# Patient Record
Sex: FEMALE | Race: WHITE | NOT HISPANIC OR LATINO | Employment: FULL TIME | URBAN - METROPOLITAN AREA
[De-identification: names, ages, dates, MRNs, and addresses within clinical notes are randomized per-mention and may not be internally consistent; named-entity substitution may affect disease eponyms.]

---

## 2020-10-06 ENCOUNTER — TRANSFERRED RECORDS (OUTPATIENT)
Dept: HEALTH INFORMATION MANAGEMENT | Facility: CLINIC | Age: 65
End: 2020-10-06

## 2020-10-22 ENCOUNTER — TRANSFERRED RECORDS (OUTPATIENT)
Dept: HEALTH INFORMATION MANAGEMENT | Facility: CLINIC | Age: 65
End: 2020-10-22

## 2020-11-19 ENCOUNTER — TRANSFERRED RECORDS (OUTPATIENT)
Dept: HEALTH INFORMATION MANAGEMENT | Facility: CLINIC | Age: 65
End: 2020-11-19

## 2020-12-02 ENCOUNTER — MEDICAL CORRESPONDENCE (OUTPATIENT)
Dept: HEALTH INFORMATION MANAGEMENT | Facility: CLINIC | Age: 65
End: 2020-12-02

## 2020-12-02 ENCOUNTER — TRANSCRIBE ORDERS (OUTPATIENT)
Dept: OTHER | Age: 65
End: 2020-12-02

## 2020-12-02 DIAGNOSIS — J34.89 LESION OR MASS OF PARANASAL SINUSES: Primary | ICD-10-CM

## 2020-12-03 ENCOUNTER — TELEPHONE (OUTPATIENT)
Dept: OTOLARYNGOLOGY | Facility: CLINIC | Age: 65
End: 2020-12-03

## 2020-12-03 ENCOUNTER — TRANSCRIBE ORDERS (OUTPATIENT)
Dept: OTOLARYNGOLOGY | Facility: CLINIC | Age: 65
End: 2020-12-03

## 2020-12-03 NOTE — TELEPHONE ENCOUNTER
"[facility] Call Center    Phone Message    May a detailed message be left on voicemail: no     Reason for Call: Appointment Intake    Referring Provider Name: Dr Amarjit Hartman at [facility]   Diagnosis and/or Symptoms: \"lesion or mass of paranasal sinuses. Very erosive/ destructive left maxillary sinus and ethmoid process. Several biopsies were benign, minimal symptoms. Please examine CT scan and pt for further evaluation and treatment options.\"    Action Taken: Message routed to:  [facility] ([facility] [[id number]]    Travel Screening: Not Applicable                                                                        "

## 2020-12-04 NOTE — TELEPHONE ENCOUNTER
Informed patient that we are reviewing her records to determine which provider she should see, and we will call her to schedule once this is finished. Patient expressed understanding.    Leann Shaw, EMT

## 2020-12-07 ENCOUNTER — DOCUMENTATION ONLY (OUTPATIENT)
Dept: OTOLARYNGOLOGY | Facility: CLINIC | Age: 65
End: 2020-12-07

## 2020-12-07 NOTE — PROGRESS NOTES
Action 12/7/2020 3:21pm -Bhao   Action Taken Received disc with images from [facility]. Disc was dropped off to [facility]N for image to be uploaded into PACS. Radiology reports were faxed to scan to uploaded into Pt's chart.    Images received:  - Ankle Three View: 10/6/2020  - CT Head Brain: 10/6/2020  - Cervical Spine:  10/6/2020  - CT Head [place]: 10/22/2020

## 2020-12-29 NOTE — TELEPHONE ENCOUNTER
Referring Provider called back, Pt told them that they never heard back about getting this scheduled. Please call Pt back to schedule ASAP. Thanks!

## 2021-01-06 ENCOUNTER — DOCUMENTATION ONLY (OUTPATIENT)
Dept: CARE COORDINATION | Facility: CLINIC | Age: 66
End: 2021-01-06

## 2021-01-12 DIAGNOSIS — J34.89 MASS OF PARANASAL SINUS: Primary | ICD-10-CM

## 2021-01-13 ENCOUNTER — PRE VISIT (OUTPATIENT)
Dept: OTOLARYNGOLOGY | Facility: CLINIC | Age: 66
End: 2021-01-13

## 2021-01-13 ENCOUNTER — OFFICE VISIT (OUTPATIENT)
Dept: OTOLARYNGOLOGY | Facility: CLINIC | Age: 66
End: 2021-01-13
Payer: COMMERCIAL

## 2021-01-13 VITALS
SYSTOLIC BLOOD PRESSURE: 130 MMHG | TEMPERATURE: 97.7 F | HEART RATE: 69 BPM | HEIGHT: 62 IN | DIASTOLIC BLOOD PRESSURE: 74 MMHG | BODY MASS INDEX: 26.5 KG/M2 | WEIGHT: 144 LBS

## 2021-01-13 DIAGNOSIS — R09.82 POST-NASAL DRIP: ICD-10-CM

## 2021-01-13 DIAGNOSIS — R09.81 NASAL CONGESTION: ICD-10-CM

## 2021-01-13 DIAGNOSIS — J32.0 CHRONIC MAXILLARY SINUSITIS: Primary | ICD-10-CM

## 2021-01-13 PROCEDURE — 87186 SC STD MICRODIL/AGAR DIL: CPT | Mod: 90 | Performed by: PATHOLOGY

## 2021-01-13 PROCEDURE — 87075 CULTR BACTERIA EXCEPT BLOOD: CPT | Mod: 90 | Performed by: PATHOLOGY

## 2021-01-13 PROCEDURE — 87070 CULTURE OTHR SPECIMN AEROBIC: CPT | Mod: 90 | Performed by: PATHOLOGY

## 2021-01-13 PROCEDURE — 87076 CULTURE ANAEROBE IDENT EACH: CPT | Mod: 90 | Performed by: PATHOLOGY

## 2021-01-13 PROCEDURE — 31237 NSL/SINS NDSC SURG BX POLYPC: CPT | Mod: 50 | Performed by: OTOLARYNGOLOGY

## 2021-01-13 PROCEDURE — 87077 CULTURE AEROBIC IDENTIFY: CPT | Mod: 90 | Performed by: PATHOLOGY

## 2021-01-13 PROCEDURE — 99204 OFFICE O/P NEW MOD 45 MIN: CPT | Mod: 25 | Performed by: OTOLARYNGOLOGY

## 2021-01-13 PROCEDURE — 99000 SPECIMEN HANDLING OFFICE-LAB: CPT | Performed by: PATHOLOGY

## 2021-01-13 RX ORDER — IBUPROFEN 200 MG
200 TABLET ORAL EVERY 4 HOURS PRN
COMMUNITY

## 2021-01-13 ASSESSMENT — PAIN SCALES - GENERAL: PAINLEVEL: NO PAIN (0)

## 2021-01-13 ASSESSMENT — MIFFLIN-ST. JEOR: SCORE: 1156.43

## 2021-01-13 NOTE — PROGRESS NOTES
[facility]                   New Patient Visit      Encounter date: January 13, 2021    Referring Provider: Amarjit Hartman MD    Chief Complaint: maxillary sinusitis    History of Present Illness: Bridgette Goddard is a 65 year-old woman who is sent by Dr. Amarjit Hartman MD for destructive process of left maxillary sinus. This was discovered on CT head incidentally after she suffered a fall. She followed up with Dr. Hartman in [place] who noted significant crusting/erosion of the middle turbinate and maxillary sinus.  She has undergone multiple biopsies which have been reviewed by [facility], but only returned inflammatory tissue. She has been treated with 3 weeks of antibiotics without improvement in her symptoms. She complains primarily of left-sided nasal congestion, facial pressure, post-nasal drip and mucus collection, which she sometimes expectorates. She has used a number of OTC meds without improvement. She denies any prior history of immunosuppression, including history of DM, cancer, or cancer treatment.     She does admit to a periodontal disease and has had multiple procedures for maxillary molar disease.     Sino-Nasal Outcome Test (SNOT - 22)  [facility]    Review of systems: A 14-point review of systems has been conducted and was negative for any notable symptoms, except as dictated in the history of present illness.     No past medical history on file.     No past surgical history on file.     No family history on file.     Social History     Socioeconomic History     Marital status:      Spouse name: Not on file     Number of children: Not on file     Years of education: Not on file     Highest education level: Not on file   Occupational History     Not on file   Social Needs     Financial resource strain: Not on file     Food insecurity     Worry: Not on file     Inability: Not on file     Transportation needs     Medical: Not on file     Non-medical: Not on  file   Tobacco Use     Smoking status: Not on file   Substance and Sexual Activity     Alcohol use: Not on file     Drug use: Not on file     Sexual activity: Not on file   Lifestyle     Physical activity     Days per week: Not on file     Minutes per session: Not on file     Stress: Not on file   Relationships     Social connections     Talks on phone: Not on file     Gets together: Not on file     Attends Gnosticism service: Not on file     Active member of club or organization: Not on file     Attends meetings of clubs or organizations: Not on file     Relationship status: Not on file     Intimate partner violence     Fear of current or ex partner: Not on file     Emotionally abused: Not on file     Physically abused: Not on file     Forced sexual activity: Not on file   Other Topics Concern     Not on file   Social History Narrative     Not on file        Physical Exam:  Vital signs: There were no vitals taken for this visit.   General Appearance: No acute distress, appropriate demeanor, conversant  Eyes: moist conjunctivae; EOMI; pupils symmetric; visual acuity grossly intact; no proptosis  Head: normocephalic; overall symmetric appearance without deformity  Face: overall symmetric without deformity; HB I/VI  Ears: Normal appearance of external ear; external meatus normal in appearance; TMs intact without perforation bilaterally;   Nose: No external deformity; septum midline; inferior turbinates without significant hypertrophy  Oral Cavity/oropharynx: Normal appearance of mucosa; tongue midline; no mass or lesions oropharynx without obvious mucosal abnormality  Neck: no palpable lymphadenopathy; thyroid without palpable nodules  Lungs: symmetric chest rise; no wheezing  CV: Good distal perfusion; normal heart rate  Extremities: No deformity  Neurologic Exam: Cranial nerves II-XII are grossly intact; no focal deficit      Procedure Note  Procedure performed: Rigid nasal endoscopy with left-sided  debridement  Indication: To evaluate for sinonasal pathology not visualized on routine anterior rhinoscopy  Anesthesia: 4% topical lidocaine with 0.05% oxymetazoline  Description of procedure: A 30 degree, 3 mm rigid endoscope was inserted into bilateral nasal cavities and the nasal valves, nasal cavity, middle meatus, sphenoethmoid recess, and nasopharynx were thoroughly evaluated for evidence of obstruction, edema, purulence, polyps and/or mass/lesion.     I then used a combination of non-cutting forceps, straight and curved suction to clear the left maxillary sinus, ethmoid cavity, and SER of crust, and purulent debris.       Concetta-Ady Endoscopic Scoring System  Endoscopic observation Right Left   Polyps in middle meatus (0 = absent, 1 = restricted to middle meatus, 2 = Beyond middle meatus) 0 0   Discharge (0 = absent, 1 = thin and clear, 2 = thick, purulent) 0 2   Edema (0 = absent, 1 = mild-moderate, 2 = moderate-severe) 0 1   Crusting (0 = absent, 1 = mild-moderate, 2 = moderate-severe) 0 2   Scarring (0= absent, 1 = mild-moderate, 2 = moderate-severe) 0 0   Total 0 5     Findings  RT: MM and SER clear  LT: thick cast  Of crust of maxillary sinus; MT partially absent; crust of SER; debrided as above    I see no evidence of mucosal changes c/w malignancy after debridement    The patient tolerated the procedure well without complication.     Laboratory Review:  n/a    Imaging Review:  I have reviewed CT sinus from 10/22/2020:   Maxillary thickening of frontal, ethmoid, max and sphenoid sinuses.   The bone of the lamina is thinned or missing  The inferior, middle, and superior turbinate appear to be missing     Pathology Review:  Reviewed outside path results from biopsy - shows primarily inflammation with many plasma cells    Assessment/Medical Decision Making/Plan:  Chronic maxillary sinusitis  Nasal crusting  Post-nasal drip  Nasal congestion    I performed left endo debridement today. I see no endoscopic  findings which would suggest malignancy. 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  The etiology/source of her infection is unclear, but could have been odontogenic. I've sent cultures from her left max sinus. I will start Bridgette on BID irrigations and likely PO abx based on her culture results.     I did discuss with Bridgette the difficulty that can be encountered with treatment of biofilm associated sinusitis.      We will remain vigilant for more sinister disease, should it become more apparent after treatment as above.         Justin Mason MD    [facility]  Rhinology  Endoscopic Skull Base Surgery  [facility]  Department of Otolaryngology - Head & Neck Surgery

## 2021-01-13 NOTE — PATIENT INSTRUCTIONS
You were seen in the ENT clinic today with Dr. Mason    Recommendations for you:    -Gentamicin nasal rinses to left nasal cavity twice daily for one month   -We will call you with the results of your cultures taken today      We would like you to follow up in 6 weeks      Please call our clinic for any questions, concerns, and/or worsening symptoms.      Clinic #[phone]       Option 1 for scheduling.    Thank you for allowing us to be a part of your care!    Talya ABRAHAM, ALEACC    If you need to reach me my direct line is: [phone]       Gentamicin Nasal Irrigations (Teddy's Solution)    - Gentamicin is a compounded antibiotic which means it is made only when ordered and by compounding pharmacies.  [facility]  [address] [place] [address]  [phone]  - This medication will arrive in the mail in capsule form.     Cleaning of the Nasal or Sinus Cavity    Please follow all three steps.  Nasal irrigation (cleaning) should be done 2 times/day.    Preparation:  1.  Wash your hands  2.  We suggest that you buy the NeilMed Sinus Rinse Kit  3.  Use distilled water or tap water that has been boiled and brought to room temperature. This is important because serious infections can result from using tap water that is not clean. These infections are very rare, but it's better to be absolutely safe.  4.  Fill the irrigation bottle with room temperature water (distilled or boiled tap water) and add mixture (pre made packet or homemade recipe).        If you wish to make a homemade recipe:        Mix 1/4 teaspoon salt (kosher,non-iodine salt) with 1/4 teaspoon baking soda in each bottle.  5. Open Gentamicin Nasal Capsule and empty contents into solution.     Cleansing Irrigation/Sinus Rinse:    1.  Lean forward over a sink and insert the rinse bottle applicator into the right side of your nose. Make sure to point the applicator towards the back of your head.     2.  Tilt head down and to  the left side.  With your mouth open (breathing through your mouth), gently direct the water around the inside of your nose until clear fluid starts to drain from the opposite nostril.  This is called flushing or irrigation.    3.  When you have used 1/4 to 1/2 or the solution, switch to the left nostril.    4.  To irrigate the left nostril, tilt your head down and to the right side.  With your mouth open (breathing through your mouth),  gently direct the water around the inside of your nose until clear fluid starts to drain from the opposite nostril.     Cleaning the Equipment:  1.  Throw away any leftover solution  2.  Clean the rinse bottle and cap with clear water. Air dry.      Call the ENT Clinic if you have any questions or concerns at [phone]

## 2021-01-13 NOTE — LETTER
1/13/2021       RE: Bridgette Goddard  [address]  [place] [address]     Dear Colleague,    Thank you for referring your patient, Bridgette Goddard, to the [facility] [place] at [facility]. Please see a copy of my visit note below.        [facility]  New Patient Visit      Encounter date: January 13, 2021    Referring Provider: Amarjit Hartman MD    Chief Complaint: maxillary sinusitis    History of Present Illness: Bridgette Goddard is a 65 year-old woman who is sent by Dr. Amarjit Hartman MD for destructive process of left maxillary sinus. This was discovered on CT head incidentally after she suffered a fall. She followed up with Dr. Hartman in [place] who noted significant crusting/erosion of the middle turbinate and maxillary sinus.  She has undergone multiple biopsies which have been reviewed by [facility], but only returned inflammatory tissue. She has been treated with 3 weeks of antibiotics without improvement in her symptoms. She complains primarily of left-sided nasal congestion, facial pressure, post-nasal drip and mucus collection, which she sometimes expectorates. She has used a number of OTC meds without improvement. She denies any prior history of immunosuppression, including history of DM, cancer, or cancer treatment.     She does admit to a periodontal disease and has had multiple procedures for maxillary molar disease.     Sino-Nasal Outcome Test (SNOT - 22)  [facility]    Review of systems: A 14-point review of systems has been conducted and was negative for any notable symptoms, except as dictated in the history of present illness.     No past medical history on file.     No past surgical history on file.     No family history on file.     Social History     Socioeconomic History     Marital status:      Spouse name: Not on file     Number of children: Not on file     Years of education: Not on file      Highest education level: Not on file   Occupational History     Not on file   Social Needs     Financial resource strain: Not on file     Food insecurity     Worry: Not on file     Inability: Not on file     Transportation needs     Medical: Not on file     Non-medical: Not on file   Tobacco Use     Smoking status: Not on file   Substance and Sexual Activity     Alcohol use: Not on file     Drug use: Not on file     Sexual activity: Not on file   Lifestyle     Physical activity     Days per week: Not on file     Minutes per session: Not on file     Stress: Not on file   Relationships     Social connections     Talks on phone: Not on file     Gets together: Not on file     Attends Adventist service: Not on file     Active member of club or organization: Not on file     Attends meetings of clubs or organizations: Not on file     Relationship status: Not on file     Intimate partner violence     Fear of current or ex partner: Not on file     Emotionally abused: Not on file     Physically abused: Not on file     Forced sexual activity: Not on file   Other Topics Concern     Not on file   Social History Narrative     Not on file        Physical Exam:  Vital signs: There were no vitals taken for this visit.   General Appearance: No acute distress, appropriate demeanor, conversant  Eyes: moist conjunctivae; EOMI; pupils symmetric; visual acuity grossly intact; no proptosis  Head: normocephalic; overall symmetric appearance without deformity  Face: overall symmetric without deformity; HB I/VI  Ears: Normal appearance of external ear; external meatus normal in appearance; TMs intact without perforation bilaterally;   Nose: No external deformity; septum midline; inferior turbinates without significant hypertrophy  Oral Cavity/oropharynx: Normal appearance of mucosa; tongue midline; no mass or lesions oropharynx without obvious mucosal abnormality  Neck: no palpable lymphadenopathy; thyroid without palpable nodules  Lungs:  symmetric chest rise; no wheezing  CV: Good distal perfusion; normal heart rate  Extremities: No deformity  Neurologic Exam: Cranial nerves II-XII are grossly intact; no focal deficit      Procedure Note  Procedure performed: Rigid nasal endoscopy with left-sided debridement  Indication: To evaluate for sinonasal pathology not visualized on routine anterior rhinoscopy  Anesthesia: 4% topical lidocaine with 0.05% oxymetazoline  Description of procedure: A 30 degree, 3 mm rigid endoscope was inserted into bilateral nasal cavities and the nasal valves, nasal cavity, middle meatus, sphenoethmoid recess, and nasopharynx were thoroughly evaluated for evidence of obstruction, edema, purulence, polyps and/or mass/lesion.     I then used a combination of non-cutting forceps, straight and curved suction to clear the left maxillary sinus, ethmoid cavity, and SER of crust, and purulent debris.       Concetta-Ady Endoscopic Scoring System  Endoscopic observation Right Left   Polyps in middle meatus (0 = absent, 1 = restricted to middle meatus, 2 = Beyond middle meatus) 0 0   Discharge (0 = absent, 1 = thin and clear, 2 = thick, purulent) 0 2   Edema (0 = absent, 1 = mild-moderate, 2 = moderate-severe) 0 1   Crusting (0 = absent, 1 = mild-moderate, 2 = moderate-severe) 0 2   Scarring (0= absent, 1 = mild-moderate, 2 = moderate-severe) 0 0   Total 0 5     Findings  RT: MM and SER clear  LT: thick cast  Of crust of maxillary sinus; MT partially absent; crust of SER; debrided as above    I see no evidence of mucosal changes c/w malignancy after debridement    The patient tolerated the procedure well without complication.     Laboratory Review:  n/a    Imaging Review:  I have reviewed CT sinus from 10/22/2020:   Maxillary thickening of frontal, ethmoid, max and sphenoid sinuses.   The bone of the lamina is thinned or missing  The inferior, middle, and superior turbinate appear to be missing     Pathology Review:  Reviewed outside  path results from biopsy - shows primarily inflammation with many plasma cells    Assessment/Medical Decision Making/Plan:  Chronic maxillary sinusitis  Nasal crusting  Post-nasal drip  Nasal congestion    I performed left endo debridement today. I see no endoscopic findings which would suggest malignancy. 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  The etiology/source of her infection is unclear, but could have been odontogenic. I've sent cultures from her left max sinus. I will start Bridgette on BID irrigations and likely PO abx based on her culture results.     I did discuss with Bridgette the difficulty that can be encountered with treatment of biofilm associated sinusitis.      We will remain vigilant for more sinister disease, should it become more apparent after treatment as above.         Justin Mason MD    [facility]  Rhinology  Endoscopic Skull Base Surgery  [facility]  Department of Otolaryngology - Head & Neck Surgery

## 2021-01-16 LAB
BACTERIA SPEC CULT: ABNORMAL
Lab: ABNORMAL
SPECIMEN SOURCE: ABNORMAL

## 2021-01-19 LAB
BACTERIA SPEC CULT: ABNORMAL
BACTERIA SPEC CULT: ABNORMAL
Lab: ABNORMAL
SPECIMEN SOURCE: ABNORMAL

## 2021-01-21 ENCOUNTER — PATIENT OUTREACH (OUTPATIENT)
Dept: OTOLARYNGOLOGY | Facility: CLINIC | Age: 66
End: 2021-01-21

## 2021-01-21 DIAGNOSIS — J32.0 CHRONIC MAXILLARY SINUSITIS: Primary | ICD-10-CM

## 2021-01-21 NOTE — PROGRESS NOTES
Left voicemail for patient to review results of recent nasal culture. Direct contact information provided for patient.     Talya Burks RN

## 2021-01-22 RX ORDER — LEVOFLOXACIN 500 MG/1
500 TABLET, FILM COATED ORAL DAILY
Qty: 14 TABLET | Refills: 0 | Status: SHIPPED | OUTPATIENT
Start: 2021-01-22 | End: 2021-02-05

## 2021-01-22 NOTE — PROGRESS NOTES
Left voicemail again requesting a call back to discuss results of recent culture results. Provided direct contact information for patient.     Talya Burks RN

## 2021-01-22 NOTE — PROGRESS NOTES
Discussed sinus culture results with patient. Writer informed patient of results and informed her that Dr. Mason would like to begin a 14 day course of Levaquin. Writer reviewed potential side effects and to call the clinic should she experience any. Patient is in agreement with this plan and denies any further questions or concerns at this time.     Talya Burks RN

## 2021-02-26 ENCOUNTER — OFFICE VISIT (OUTPATIENT)
Dept: OTOLARYNGOLOGY | Facility: CLINIC | Age: 66
End: 2021-02-26
Payer: COMMERCIAL

## 2021-02-26 VITALS — WEIGHT: 147 LBS | HEART RATE: 81 BPM | OXYGEN SATURATION: 95 % | BODY MASS INDEX: 26.89 KG/M2

## 2021-02-26 DIAGNOSIS — R09.81 NASAL CONGESTION: ICD-10-CM

## 2021-02-26 DIAGNOSIS — J32.0 CHRONIC MAXILLARY SINUSITIS: ICD-10-CM

## 2021-02-26 DIAGNOSIS — J32.0 CHRONIC MAXILLARY SINUSITIS: Primary | ICD-10-CM

## 2021-02-26 PROCEDURE — 87070 CULTURE OTHR SPECIMN AEROBIC: CPT | Mod: 90 | Performed by: PATHOLOGY

## 2021-02-26 PROCEDURE — 87075 CULTR BACTERIA EXCEPT BLOOD: CPT | Mod: 90 | Performed by: PATHOLOGY

## 2021-02-26 PROCEDURE — 99000 SPECIMEN HANDLING OFFICE-LAB: CPT | Performed by: PATHOLOGY

## 2021-02-26 PROCEDURE — 31237 NSL/SINS NDSC SURG BX POLYPC: CPT | Mod: LT | Performed by: OTOLARYNGOLOGY

## 2021-02-26 PROCEDURE — 87076 CULTURE ANAEROBE IDENT EACH: CPT | Mod: 90 | Performed by: PATHOLOGY

## 2021-02-26 PROCEDURE — 99212 OFFICE O/P EST SF 10 MIN: CPT | Mod: 25 | Performed by: OTOLARYNGOLOGY

## 2021-02-26 ASSESSMENT — PAIN SCALES - GENERAL: PAINLEVEL: NO PAIN (0)

## 2021-02-26 NOTE — PATIENT INSTRUCTIONS
1. You were seen in the ENT Clinic today by Dr. Mason.      2.   Plan to meet with your dentist to talk about dental health.    3.   Plan to start and antibiotic based on culture results.    2.   Plan to return to clinic in 2 months.        Please call our clinic for any questions, concerns, and/or worsening symptoms.      Clinic #[phone]       Option 1 for scheduling.     Thank you for allowing us to be a part of your care!     Jennifer MIRANDA LPN  [facility] ENT     If you need to reach me my direct line is: [phone].

## 2021-02-26 NOTE — LETTER
2/26/2021       RE: Bridgette Goddard  [address] [address]     Dear Colleague,    Thank you for referring your patient, Bridgette Goddard, to the [facility] EAR NO[facility]ROAT [facility]INIC [place] at [facility]. Please see a copy of my visit note below.    [facility]                     Return Patient Visit      Encounter date: February 26, 2021    Referring Provider: Amarjit Hartman MD    Chief Complaint: maxillary sinusitis    History of Present Illness/Initial visit: Bridgette Goddard is a 65 year-old woman who is sent by Dr. Amarjit Hartman MD for destructive process of left maxillary sinus. This was discovered on CT head incidentally after she suffered a fall. She followed up with Dr. Hartman in [place] who noted significant crusting/erosion of the middle turbinate and maxillary sinus.  She has undergone multiple biopsies which have been reviewed by [facility], but only returned inflammatory tissue. She has been treated with 3 weeks of antibiotics without improvement in her symptoms. She complains primarily of left-sided nasal congestion, facial pressure, post-nasal drip and mucus collection, which she sometimes expectorates. She has used a number of OTC meds without improvement. She denies any prior history of immunosuppression, including history of DM, cancer, or cancer treatment.     She does admit to a periodontal disease and has had multiple procedures for maxillary molar disease.     Interval visit:  Returns for follow up after completing levaquin and gent rinses  Notes significant improvement in symptoms of left fullness and pressure      Sino-Nasal Outcome Test (SNOT - 22)  DNC    Review of systems: A 14-point review of systems has been conducted and was negative for any notable symptoms, except as dictated in the history of present illness.     No past medical history on file.     No past surgical history on file.      No family history on file.     Social History     Socioeconomic History     Marital status:      Spouse name: Not on file     Number of children: Not on file     Years of education: Not on file     Highest education level: Not on file   Occupational History     Not on file   Social Needs     Financial resource strain: Not on file     Food insecurity     Worry: Not on file     Inability: Not on file     Transportation needs     Medical: Not on file     Non-medical: Not on file   Tobacco Use     Smoking status: Not on file   Substance and Sexual Activity     Alcohol use: Not on file     Drug use: Not on file     Sexual activity: Not on file   Lifestyle     Physical activity     Days per week: Not on file     Minutes per session: Not on file     Stress: Not on file   Relationships     Social connections     Talks on phone: Not on file     Gets together: Not on file     Attends Jewish service: Not on file     Active member of club or organization: Not on file     Attends meetings of clubs or organizations: Not on file     Relationship status: Not on file     Intimate partner violence     Fear of current or ex partner: Not on file     Emotionally abused: Not on file     Physically abused: Not on file     Forced sexual activity: Not on file   Other Topics Concern     Not on file   Social History Narrative     Not on file        Physical Exam:  Vital signs: There were no vitals taken for this visit.   General Appearance: No acute distress, appropriate demeanor, conversant  Eyes: moist conjunctivae; EOMI; pupils symmetric; visual acuity grossly intact; no proptosis  Head: normocephalic; overall symmetric appearance without deformity  Face: overall symmetric without deformity; HB I/VI  Ears: Normal appearance of external ear;   Nose: No external deformity; see nasal endoscopy  Neurologic Exam: Cranial nerves II-XII are grossly intact; no focal deficit      Procedure Note  Procedure performed: Rigid nasal  endoscopy with left-sided debridement  Indication: To evaluate for sinonasal pathology not visualized on routine anterior rhinoscopy  Anesthesia: 4% topical lidocaine with 0.05% oxymetazoline  Description of procedure: A 30 degree, 3 mm rigid endoscope was inserted into bilateral nasal cavities and the nasal valves, nasal cavity, middle meatus, sphenoethmoid recess, and nasopharynx were thoroughly evaluated for evidence of obstruction, edema, purulence, polyps and/or mass/lesion.     I then used a combination of non-cutting forceps, straight and curved suction to clear the left maxillary sinus,  of crust, and purulent debris.       Concetta-Ady Endoscopic Scoring System  Endoscopic observation Right Left   Polyps in middle meatus (0 = absent, 1 = restricted to middle meatus, 2 = Beyond middle meatus) 0 0   Discharge (0 = absent, 1 = thin and clear, 2 = thick, purulent) 0 1   Edema (0 = absent, 1 = mild-moderate, 2 = moderate-severe) 0 1   Crusting (0 = absent, 1 = mild-moderate, 2 = moderate-severe) 0 1   Scarring (0= absent, 1 = mild-moderate, 2 = moderate-severe) 0 0   Total 0 3     Findings  RT: MM and SER clear  LT: small cast of max sinus debrided and cultured;   Significant improvement in crusting and mucosal edema of maxillary sinus    The patient tolerated the procedure well without complication.     Laboratory Review:  Culture Micro Abnormal   Light growth   Staphylococcus epidermidis   Susceptibility testing not routinely done     Culture Micro Abnormal   Heavy growth   Klebsiella (Enterobacter) aerogenes     Culture Micro Abnormal   Moderate growth   Strain 2   Klebsiella (Enterobacter) aerogenes     [facility]   Susceptibility     Klebsiella (enterobacter) aerogenes (2) Klebsiella (enterobacter) aerogenes (3)     DARLING DARLING     CEFEPIME <=1 ug/mL Sensitive <=1 ug/mL Sensitive     CEFTAZIDIME <=1 ug/mL Sensitive <=1 ug/mL Sensitive     CEFTRIAXONE <=1 ug/mL Sensitive <=1 ug/mL Sensitive      CIPROFLOXACIN <=0.25 ug/mL Sensitive <=0.25 ug/mL Sensitive     GENTAMICIN <=1 ug/mL Sensitive <=1 ug/mL Sensitive     LEVOFLOXACIN <=0.12 ug/mL Sensitive <=0.12 ug/mL Sensitive     MEROPENEM <=0.25 ug/mL Sensitive <=0.25 ug/mL Sensitive     Piperacillin/Tazo <=4 ug/mL Sensitive <=4 ug/mL Sensitive     TOBRAMYCIN <=1 ug/mL Sensitive <=1 ug/mL Sensitive     Trimethoprim/Sulfa <=1/19 ug/mL Sensitive <=1/19 ug/mL Sensitive                   Specimen Collected: 01/13/21  2:45 PM Last Resulted: 01/16/21  7:48 AM            Imaging Review:  I have reviewed CT sinus from 10/22/2020:   Maxillary thickening of frontal, ethmoid, max and sphenoid sinuses.   The bone of the lamina is thinned or missing  The inferior, middle, and superior turbinate appear to be missing     Pathology Review:  Reviewed outside path results from biopsy - shows primarily inflammation with many plasma cells    Assessment/Medical Decision Making/Plan:  Chronic maxillary sinusitis  Nasal crusting  Post-nasal drip  Nasal congestion    -I performed left endo debridement today.   -Bridgette's infection/inflammation has significantly improved with levaquin and compounded gent rinses  -I have asked Bridgette to revisit with her dentist to ensure her teeth are in good health, as much of the bacteria cultured from her LT max sinus seems to be from oral cavity, raising suspicion of odontogenic infection.   -I recommend continuing gentamicin rinses until completion of prescription  -Will start antibiotics based on today's culture only for recurrence of symptoms - she will call and let us know should she experience recurrence in symptoms  -RTC in 2 months    Justin Mason MD    [facility]  Rhinology  Endoscopic Skull Base Surgery  [facility]  Department of Otolaryngology - Head & Neck Surgery

## 2021-02-26 NOTE — PROGRESS NOTES
[facility]                   Return Patient Visit      Encounter date: February 26, 2021    Referring Provider: Amarjit Hartman MD    Chief Complaint: maxillary sinusitis    History of Present Illness/Initial visit: Bridgette Goddard is a 65 year-old woman who is sent by Dr. Amarjit Hartman MD for destructive process of left maxillary sinus. This was discovered on CT head incidentally after she suffered a fall. She followed up with Dr. Hartman in [place] who noted significant crusting/erosion of the middle turbinate and maxillary sinus.  She has undergone multiple biopsies which have been reviewed by [facility], but only returned inflammatory tissue. She has been treated with 3 weeks of antibiotics without improvement in her symptoms. She complains primarily of left-sided nasal congestion, facial pressure, post-nasal drip and mucus collection, which she sometimes expectorates. She has used a number of OTC meds without improvement. She denies any prior history of immunosuppression, including history of DM, cancer, or cancer treatment.     She does admit to a periodontal disease and has had multiple procedures for maxillary molar disease.     Interval visit:  Returns for follow up after completing levaquin and gent rinses  Notes significant improvement in symptoms of left fullness and pressure      Sino-Nasal Outcome Test (SNOT - 22)  DNC    Review of systems: A 14-point review of systems has been conducted and was negative for any notable symptoms, except as dictated in the history of present illness.     No past medical history on file.     No past surgical history on file.     No family history on file.     Social History     Socioeconomic History     Marital status:      Spouse name: Not on file     Number of children: Not on file     Years of education: Not on file     Highest education level: Not on file   Occupational History     Not on file   Social Needs      Financial resource strain: Not on file     Food insecurity     Worry: Not on file     Inability: Not on file     Transportation needs     Medical: Not on file     Non-medical: Not on file   Tobacco Use     Smoking status: Not on file   Substance and Sexual Activity     Alcohol use: Not on file     Drug use: Not on file     Sexual activity: Not on file   Lifestyle     Physical activity     Days per week: Not on file     Minutes per session: Not on file     Stress: Not on file   Relationships     Social connections     Talks on phone: Not on file     Gets together: Not on file     Attends Orthodoxy service: Not on file     Active member of club or organization: Not on file     Attends meetings of clubs or organizations: Not on file     Relationship status: Not on file     Intimate partner violence     Fear of current or ex partner: Not on file     Emotionally abused: Not on file     Physically abused: Not on file     Forced sexual activity: Not on file   Other Topics Concern     Not on file   Social History Narrative     Not on file        Physical Exam:  Vital signs: There were no vitals taken for this visit.   General Appearance: No acute distress, appropriate demeanor, conversant  Eyes: moist conjunctivae; EOMI; pupils symmetric; visual acuity grossly intact; no proptosis  Head: normocephalic; overall symmetric appearance without deformity  Face: overall symmetric without deformity; HB I/VI  Ears: Normal appearance of external ear;   Nose: No external deformity; see nasal endoscopy  Neurologic Exam: Cranial nerves II-XII are grossly intact; no focal deficit      Procedure Note  Procedure performed: Rigid nasal endoscopy with left-sided debridement  Indication: To evaluate for sinonasal pathology not visualized on routine anterior rhinoscopy  Anesthesia: 4% topical lidocaine with 0.05% oxymetazoline  Description of procedure: A 30 degree, 3 mm rigid endoscope was inserted into bilateral nasal cavities and the nasal  valves, nasal cavity, middle meatus, sphenoethmoid recess, and nasopharynx were thoroughly evaluated for evidence of obstruction, edema, purulence, polyps and/or mass/lesion.     I then used a combination of non-cutting forceps, straight and curved suction to clear the left maxillary sinus,  of crust, and purulent debris.       [place]-Ady Endoscopic Scoring System  Endoscopic observation Right Left   Polyps in middle meatus (0 = absent, 1 = restricted to middle meatus, 2 = Beyond middle meatus) 0 0   Discharge (0 = absent, 1 = thin and clear, 2 = thick, purulent) 0 1   Edema (0 = absent, 1 = mild-moderate, 2 = moderate-severe) 0 1   Crusting (0 = absent, 1 = mild-moderate, 2 = moderate-severe) 0 1   Scarring (0= absent, 1 = mild-moderate, 2 = moderate-severe) 0 0   Total 0 3     Findings  RT: MM and SER clear  LT: small cast of max sinus debrided and cultured;   Significant improvement in crusting and mucosal edema of maxillary sinus    The patient tolerated the procedure well without complication.     Laboratory Review:  Culture Micro Abnormal   Light growth   Staphylococcus epidermidis   Susceptibility testing not routinely done     Culture Micro Abnormal   Heavy growth   Klebsiella (Enterobacter) aerogenes     Culture Micro Abnormal   Moderate growth   Strain 2   Klebsiella (Enterobacter) aerogenes     [facility]   Susceptibility     Klebsiella (enterobacter) aerogenes (2) Klebsiella (enterobacter) aerogenes (3)     DARLING DARLING     CEFEPIME <=1 ug/mL Sensitive <=1 ug/mL Sensitive     CEFTAZIDIME <=1 ug/mL Sensitive <=1 ug/mL Sensitive     CEFTRIAXONE <=1 ug/mL Sensitive <=1 ug/mL Sensitive     CIPROFLOXACIN <=0.25 ug/mL Sensitive <=0.25 ug/mL Sensitive     GENTAMICIN <=1 ug/mL Sensitive <=1 ug/mL Sensitive     LEVOFLOXACIN <=0.12 ug/mL Sensitive <=0.12 ug/mL Sensitive     MEROPENEM <=0.25 ug/mL Sensitive <=0.25 ug/mL Sensitive     Piperacillin/Tazo <=4 ug/mL Sensitive <=4 ug/mL Sensitive      TOBRAMYCIN <=1 ug/mL Sensitive <=1 ug/mL Sensitive     Trimethoprim/Sulfa <=1/19 ug/mL Sensitive <=1/19 ug/mL Sensitive                   Specimen Collected: 01/13/21  2:45 PM Last Resulted: 01/16/21  7:48 AM            Imaging Review:  I have reviewed CT sinus from 10/22/2020:   Maxillary thickening of frontal, ethmoid, max and sphenoid sinuses.   The bone of the lamina is thinned or missing  The inferior, middle, and superior turbinate appear to be missing     Pathology Review:  Reviewed outside path results from biopsy - shows primarily inflammation with many plasma cells    Assessment/Medical Decision Making/Plan:  Chronic maxillary sinusitis  Nasal crusting  Post-nasal drip  Nasal congestion    -I performed left endo debridement today.   -Bridgette's infection/inflammation has significantly improved with levaquin and compounded gent rinses  -I have asked Bridgette to revisit with her dentist to ensure her teeth are in good health, as much of the bacteria cultured from her LT max sinus seems to be from oral cavity, raising suspicion of odontogenic infection.   -I recommend continuing gentamicin rinses until completion of prescription  -Will start antibiotics based on today's culture only for recurrence of symptoms - she will call and let us know should she experience recurrence in symptoms  -RTC in 2 months    Justin Mason MD    [facility]  Rhinology  Endoscopic Skull Base Surgery  [facility]  Department of Otolaryngology - Head & Neck Surgery

## 2021-02-26 NOTE — NURSING NOTE
Chief Complaint   Patient presents with     RECHECK     6 week follow up         Pulse 81, weight 66.7 kg (147 lb), SpO2 95 %.    Leann Shaw EMT

## 2021-02-28 LAB
BACTERIA SPEC CULT: NORMAL
Lab: NORMAL
SPECIMEN SOURCE: NORMAL

## 2021-03-05 LAB
BACTERIA SPEC CULT: ABNORMAL
Lab: ABNORMAL
SPECIMEN SOURCE: ABNORMAL

## 2021-03-31 ENCOUNTER — TRANSFERRED RECORDS (OUTPATIENT)
Dept: HEALTH INFORMATION MANAGEMENT | Facility: CLINIC | Age: 66
End: 2021-03-31

## 2021-04-29 NOTE — PROGRESS NOTES
[facility]                   Return Patient Visit      Encounter date: April 30, 2021    Referring Provider: Amarjit Hartman MD    Chief Complaint: maxillary sinusitis    History of Present Illness/Initial visit: Bridgette Goddard is a 65 year-old woman who is sent by Dr. Amarjit Hartman MD for destructive process of left maxillary sinus. This was discovered on CT head incidentally after she suffered a fall. She followed up with Dr. Hartman in [place] who noted significant crusting/erosion of the middle turbinate and maxillary sinus.  She has undergone multiple biopsies which have been reviewed by [facility], but only returned inflammatory tissue. She has been treated with 3 weeks of antibiotics without improvement in her symptoms. She complains primarily of left-sided nasal congestion, facial pressure, post-nasal drip and mucus collection, which she sometimes expectorates. She has used a number of OTC meds without improvement. She denies any prior history of immunosuppression, including history of DM, cancer, or cancer treatment.     She does admit to a periodontal disease and has had multiple procedures for maxillary molar disease.     Interval visit:  Returns for follow up after completing levaquin and gent rinses  Notes significant improvement in symptoms of left fullness and pressure  She will have occasional postnasal drainage and pressure but this is significant improved  She completed additional course of gentamicin rinses and now is using saline only  She did see the dentist and had an additional root canal the left maxillary molar    Sino-Nasal Outcome Test (SNOT - 22)  [facility]    Review of systems: A 14-point review of systems has been conducted and was negative for any notable symptoms, except as dictated in the history of present illness.     No past medical history on file.     No past surgical history on file.     No family history on file.     Social History      Socioeconomic History     Marital status:      Spouse name: Not on file     Number of children: Not on file     Years of education: Not on file     Highest education level: Not on file   Occupational History     Not on file   Social Needs     Financial resource strain: Not on file     Food insecurity     Worry: Not on file     Inability: Not on file     Transportation needs     Medical: Not on file     Non-medical: Not on file   Tobacco Use     Smoking status: Never Smoker     Smokeless tobacco: Never Used   Substance and Sexual Activity     Alcohol use: Not on file     Drug use: Not on file     Sexual activity: Not on file   Lifestyle     Physical activity     Days per week: Not on file     Minutes per session: Not on file     Stress: Not on file   Relationships     Social connections     Talks on phone: Not on file     Gets together: Not on file     Attends Caodaism service: Not on file     Active member of club or organization: Not on file     Attends meetings of clubs or organizations: Not on file     Relationship status: Not on file     Intimate partner violence     Fear of current or ex partner: Not on file     Emotionally abused: Not on file     Physically abused: Not on file     Forced sexual activity: Not on file   Other Topics Concern     Not on file   Social History Narrative     Not on file        Physical Exam:  Vital signs: There were no vitals taken for this visit.   General Appearance: No acute distress, appropriate demeanor, conversant  Eyes: moist conjunctivae; EOMI; pupils symmetric; visual acuity grossly intact; no proptosis  Head: normocephalic; overall symmetric appearance without deformity  Face: overall symmetric without deformity; HB I/VI  Ears: Normal appearance of external ear;   Nose: No external deformity; see nasal endoscopy  Neurologic Exam: Cranial nerves II-XII are grossly intact; no focal deficit      Procedure Note  Procedure performed: Rigid nasal endoscopy with  left-sided debridement and removal of inferior turbinate  Indication: To evaluate for sinonasal pathology not visualized on routine anterior rhinoscopy  Anesthesia: 4% topical lidocaine with 0.05% oxymetazoline  Description of procedure: A 30 degree, 3 mm rigid endoscope was inserted into bilateral nasal cavities and the nasal valves, nasal cavity, middle meatus, sphenoethmoid recess, and nasopharynx were thoroughly evaluated for evidence of obstruction, edema, purulence, polyps and/or mass/lesion.     The inferior turbinate was only attached at anterior nasal wall  After injection with 3cc of 1% lidocaine with 1:100,000 epinephine into the inferior turbinate, I used a straight through-cutting forceps to resect the near entirety of the inferior turbinate    Bleeding was controlled with silver nitrate    Concetta-Ady Endoscopic Scoring System  Endoscopic observation Right Left   Polyps in middle meatus (0 = absent, 1 = restricted to middle meatus, 2 = Beyond middle meatus) 0 0   Discharge (0 = absent, 1 = thin and clear, 2 = thick, purulent) 0 1   Edema (0 = absent, 1 = mild-moderate, 2 = moderate-severe) 0 1   Crusting (0 = absent, 1 = mild-moderate, 2 = moderate-severe) 0 1   Scarring (0= absent, 1 = mild-moderate, 2 = moderate-severe) 0 0   Total 0 3     Findings  RT: MM and SER clear  LT: mild crust of max sinus; mucopus from SER, suctioned  Partially detached IT, resected as above    The patient tolerated the procedure well without complication.     Laboratory Review:  Culture Micro Abnormal   Light growth   Staphylococcus epidermidis   Susceptibility testing not routinely done     Culture Micro Abnormal   Heavy growth   Klebsiella (Enterobacter) aerogenes     Culture Micro Abnormal   Moderate growth   Strain 2   Klebsiella (Enterobacter) aerogenes     [facility]   Susceptibility     Klebsiella (enterobacter) aerogenes (2) Klebsiella (enterobacter) aerogenes (3)     DARLING DARLING     CEFEPIME <=1 ug/mL  Sensitive <=1 ug/mL Sensitive     CEFTAZIDIME <=1 ug/mL Sensitive <=1 ug/mL Sensitive     CEFTRIAXONE <=1 ug/mL Sensitive <=1 ug/mL Sensitive     CIPROFLOXACIN <=0.25 ug/mL Sensitive <=0.25 ug/mL Sensitive     GENTAMICIN <=1 ug/mL Sensitive <=1 ug/mL Sensitive     LEVOFLOXACIN <=0.12 ug/mL Sensitive <=0.12 ug/mL Sensitive     MEROPENEM <=0.25 ug/mL Sensitive <=0.25 ug/mL Sensitive     Piperacillin/Tazo <=4 ug/mL Sensitive <=4 ug/mL Sensitive     TOBRAMYCIN <=1 ug/mL Sensitive <=1 ug/mL Sensitive     Trimethoprim/Sulfa <=1/19 ug/mL Sensitive <=1/19 ug/mL Sensitive                   Specimen Collected: 01/13/21  2:45 PM Last Resulted: 01/16/21  7:48 AM            Imaging Review:  I have reviewed CT sinus from 10/22/2020:   Maxillary thickening of frontal, ethmoid, max and sphenoid sinuses.   The bone of the lamina is thinned or missing  The inferior, middle, and superior turbinate appear to be missing     Pathology Review:  Reviewed outside path results from biopsy - shows primarily inflammation with many plasma cells    Assessment/Medical Decision Making/Plan:  Chronic maxillary sinusitis  Nasal crusting  Post-nasal drip  Nasal congestion    -I performed left endo debridement today and resection of residual inferior turbinate.   -Bridgette's infection/inflammation has significantly improved with levaquin and compounded gent rinses  -I have asked Bina to continue saline only rinses once daily  -She can follow up with her ENT locally - will reach out to him  -RTC to see me as needed    Justin Mason MD    [facility]  Rhinology  Endoscopic Skull Base Surgery  [facility]  Department of Otolaryngology - Head & Neck Surgery

## 2021-04-30 ENCOUNTER — OFFICE VISIT (OUTPATIENT)
Dept: OTOLARYNGOLOGY | Facility: CLINIC | Age: 66
End: 2021-04-30
Payer: COMMERCIAL

## 2021-04-30 VITALS — HEIGHT: 62 IN | WEIGHT: 147 LBS | BODY MASS INDEX: 27.05 KG/M2

## 2021-04-30 DIAGNOSIS — J34.3 NASAL TURBINATE HYPERTROPHY: ICD-10-CM

## 2021-04-30 DIAGNOSIS — J32.0 CHRONIC MAXILLARY SINUSITIS: Primary | ICD-10-CM

## 2021-04-30 PROCEDURE — 88304 TISSUE EXAM BY PATHOLOGIST: CPT | Performed by: PATHOLOGY

## 2021-04-30 PROCEDURE — 30802 ABLATE INF TURBINATE SUBMUC: CPT | Performed by: OTOLARYNGOLOGY

## 2021-04-30 PROCEDURE — 99212 OFFICE O/P EST SF 10 MIN: CPT | Mod: 25 | Performed by: OTOLARYNGOLOGY

## 2021-04-30 ASSESSMENT — MIFFLIN-ST. JEOR: SCORE: 1170.04

## 2021-04-30 ASSESSMENT — PAIN SCALES - GENERAL: PAINLEVEL: NO PAIN (0)

## 2021-04-30 NOTE — PATIENT INSTRUCTIONS
1. You were seen in the clinic today by Dr. Mason    2.   The following has been recommended for you:   -Specimen has been sent to lab. Someone will call you to discuss the results.   -Continue sinus rinses once daily.   -Apply ointment to the nose for any dryness that occurs.    3.   Plan to return the clinic if needed. Follow up with your local ENT in 3 months.    If you have any questions or concerns after your appointment, please call the clinic.    -Clinic phone [phone]. Press option #1 for scheduling related needs. Press option #3 for nurse advice.   -Direct phone [phone]    Jasmin Flores LPN  [facility]  Department of Otolaryngology

## 2021-04-30 NOTE — NURSING NOTE
"Chief Complaint   Patient presents with     RECHECK     2 month follow up         Height 1.575 m (5' 2\"), weight 66.7 kg (147 lb).    Leann Shaw, EMT    "

## 2021-05-01 ENCOUNTER — HEALTH MAINTENANCE LETTER (OUTPATIENT)
Age: 66
End: 2021-05-01

## 2021-05-04 LAB — COPATH REPORT: NORMAL

## 2021-10-11 ENCOUNTER — HEALTH MAINTENANCE LETTER (OUTPATIENT)
Age: 66
End: 2021-10-11

## 2021-12-05 ENCOUNTER — HEALTH MAINTENANCE LETTER (OUTPATIENT)
Age: 66
End: 2021-12-05

## 2022-09-24 ENCOUNTER — HEALTH MAINTENANCE LETTER (OUTPATIENT)
Age: 67
End: 2022-09-24

## 2023-01-30 ENCOUNTER — HEALTH MAINTENANCE LETTER (OUTPATIENT)
Age: 68
End: 2023-01-30

## 2023-05-08 ENCOUNTER — HEALTH MAINTENANCE LETTER (OUTPATIENT)
Age: 68
End: 2023-05-08

## 2023-10-17 NOTE — LETTER
4/30/2021       RE: Bridgette Goddard  [address] [facility] [address]     Dear Colleague,    Thank you for referring your patient, Bridgette Goddard, to the [facility] [place] at [facility]. Please see a copy of my visit note below.                    [facility]                     Return Patient Visit      Encounter date: April 30, 2021    Referring Provider: Amarjit Hartman MD    Chief Complaint: maxillary sinusitis    History of Present Illness/Initial visit: Bridgette Goddard is a 65 year-old woman who is sent by Dr. Amarjit Hartman MD for destructive process of left maxillary sinus. This was discovered on CT head incidentally after she suffered a fall. She followed up with Dr. Hartman in [place] who noted significant crusting/erosion of the middle turbinate and maxillary sinus.  She has undergone multiple biopsies which have been reviewed by [facility], but only returned inflammatory tissue. She has been treated with 3 weeks of antibiotics without improvement in her symptoms. She complains primarily of left-sided nasal congestion, facial pressure, post-nasal drip and mucus collection, which she sometimes expectorates. She has used a number of OTC meds without improvement. She denies any prior history of immunosuppression, including history of DM, cancer, or cancer treatment.     She does admit to a periodontal disease and has had multiple procedures for maxillary molar disease.     Interval visit:  Returns for follow up after completing levaquin and gent rinses  Notes significant improvement in symptoms of left fullness and pressure  She will have occasional postnasal drainage and pressure but this is significant improved  She completed additional course of gentamicin rinses and now is using saline only  She did see the dentist and had an additional root canal the left maxillary molar    Sino-Nasal  Outcome Test (SNOT - 22)  DNC    Review of systems: A 14-point review of systems has been conducted and was negative for any notable symptoms, except as dictated in the history of present illness.     No past medical history on file.     No past surgical history on file.     No family history on file.     Social History     Socioeconomic History     Marital status:      Spouse name: Not on file     Number of children: Not on file     Years of education: Not on file     Highest education level: Not on file   Occupational History     Not on file   Social Needs     Financial resource strain: Not on file     Food insecurity     Worry: Not on file     Inability: Not on file     Transportation needs     Medical: Not on file     Non-medical: Not on file   Tobacco Use     Smoking status: Never Smoker     Smokeless tobacco: Never Used   Substance and Sexual Activity     Alcohol use: Not on file     Drug use: Not on file     Sexual activity: Not on file   Lifestyle     Physical activity     Days per week: Not on file     Minutes per session: Not on file     Stress: Not on file   Relationships     Social connections     Talks on phone: Not on file     Gets together: Not on file     Attends [employer] service: Not on file     Active member of club or organization: Not on file     Attends meetings of clubs or organizations: Not on file     Relationship status: Not on file     Intimate partner violence     Fear of current or ex partner: Not on file     Emotionally abused: Not on file     Physically abused: Not on file     Forced sexual activity: Not on file   Other Topics Concern     Not on file   Social History Narrative     Not on file        Physical Exam:  Vital signs: There were no vitals taken for this visit.   General Appearance: No acute distress, appropriate demeanor, conversant  Eyes: moist conjunctivae; EOMI; pupils symmetric; visual acuity grossly intact; no proptosis  Head: normocephalic; overall symmetric  appearance without deformity  Face: overall symmetric without deformity; HB I/VI  Ears: Normal appearance of external ear;   Nose: No external deformity; see nasal endoscopy  Neurologic Exam: Cranial nerves II-XII are grossly intact; no focal deficit      Procedure Note  Procedure performed: Rigid nasal endoscopy with left-sided debridement and removal of inferior turbinate  Indication: To evaluate for sinonasal pathology not visualized on routine anterior rhinoscopy  Anesthesia: 4% topical lidocaine with 0.05% oxymetazoline  Description of procedure: A 30 degree, 3 mm rigid endoscope was inserted into bilateral nasal cavities and the nasal valves, nasal cavity, middle meatus, sphenoethmoid recess, and nasopharynx were thoroughly evaluated for evidence of obstruction, edema, purulence, polyps and/or mass/lesion.     The inferior turbinate was only attached at anterior nasal wall  After injection with 3cc of 1% lidocaine with 1:100,000 epinephine into the inferior turbinate, I used a straight through-cutting forceps to resect the near entirety of the inferior turbinate    Bleeding was controlled with silver nitrate    [place] Endoscopic Scoring System  Endoscopic observation Right Left   Polyps in middle meatus (0 = absent, 1 = restricted to middle meatus, 2 = Beyond middle meatus) 0 0   Discharge (0 = absent, 1 = thin and clear, 2 = thick, purulent) 0 1   Edema (0 = absent, 1 = mild-moderate, 2 = moderate-severe) 0 1   Crusting (0 = absent, 1 = mild-moderate, 2 = moderate-severe) 0 1   Scarring (0= absent, 1 = mild-moderate, 2 = moderate-severe) 0 0   Total 0 3     Findings  RT: MM and SER clear  LT: mild crust of max sinus; mucopus from SER, suctioned  Partially detached IT, resected as above    The patient tolerated the procedure well without complication.     Laboratory Review:  Culture Micro Abnormal   Light growth   Staphylococcus epidermidis   Susceptibility testing not routinely done     Culture Micro  Abnormal   Heavy growth   Klebsiella (Enterobacter) aerogenes     Culture Micro Abnormal   Moderate growth   Strain 2   Klebsiella (Enterobacter) aerogenes     Resulting Agency [facility]   Susceptibility     Klebsiella (enterobacter) aerogenes (2) Klebsiella (enterobacter) aerogenes (3)     DARLING DARLING     CEFEPIME <=1 ug/mL Sensitive <=1 ug/mL Sensitive     CEFTAZIDIME <=1 ug/mL Sensitive <=1 ug/mL Sensitive     CEFTRIAXONE <=1 ug/mL Sensitive <=1 ug/mL Sensitive     CIPROFLOXACIN <=0.25 ug/mL Sensitive <=0.25 ug/mL Sensitive     GENTAMICIN <=1 ug/mL Sensitive <=1 ug/mL Sensitive     LEVOFLOXACIN <=0.12 ug/mL Sensitive <=0.12 ug/mL Sensitive     MEROPENEM <=0.25 ug/mL Sensitive <=0.25 ug/mL Sensitive     Piperacillin/Tazo <=4 ug/mL Sensitive <=4 ug/mL Sensitive     TOBRAMYCIN <=1 ug/mL Sensitive <=1 ug/mL Sensitive     Trimethoprim/Sulfa <=1/19 ug/mL Sensitive <=1/19 ug/mL Sensitive                   Specimen Collected: 01/13/21  2:45 PM Last Resulted: 01/16/21  7:48 AM            Imaging Review:  I have reviewed CT sinus from 10/22/2020:   Maxillary thickening of frontal, ethmoid, max and sphenoid sinuses.   The bone of the lamina is thinned or missing  The inferior, middle, and superior turbinate appear to be missing     Pathology Review:  Reviewed outside path results from biopsy - shows primarily inflammation with many plasma cells    Assessment/Medical Decision Making/Plan:  Chronic maxillary sinusitis  Nasal crusting  Post-nasal drip  Nasal congestion    -I performed left endo debridement today and resection of residual inferior turbinate.   -Bridgette's infection/inflammation has significantly improved with levaquin and compounded gent rinses  -I have asked Bina to continue saline only rinses once daily  -She can follow up with her ENT locally - will reach out to him  -RTC to see me as needed    Justin Mason MD    [facility]  Rhinology  Endoscopic Skull Base  Surgery  [facility]  Department of Otolaryngology - Head & Neck Surgery             No

## 2024-03-02 ENCOUNTER — HEALTH MAINTENANCE LETTER (OUTPATIENT)
Age: 69
End: 2024-03-02

## (undated) RX ORDER — LIDOCAINE HYDROCHLORIDE AND EPINEPHRINE 10; 10 MG/ML; UG/ML
INJECTION, SOLUTION INFILTRATION; PERINEURAL
Status: DISPENSED
Start: 2021-04-30